# Patient Record
Sex: FEMALE | Race: BLACK OR AFRICAN AMERICAN | Employment: UNEMPLOYED | URBAN - METROPOLITAN AREA
[De-identification: names, ages, dates, MRNs, and addresses within clinical notes are randomized per-mention and may not be internally consistent; named-entity substitution may affect disease eponyms.]

---

## 2024-10-19 ENCOUNTER — HOSPITAL ENCOUNTER (EMERGENCY)
Age: 34
Discharge: HOME | End: 2024-10-19

## 2024-10-19 VITALS
HEART RATE: 90 BPM | OXYGEN SATURATION: 97 % | DIASTOLIC BLOOD PRESSURE: 103 MMHG | RESPIRATION RATE: 18 BRPM | TEMPERATURE: 98.4 F | SYSTOLIC BLOOD PRESSURE: 144 MMHG

## 2024-10-19 DIAGNOSIS — J02.9 VIRAL PHARYNGITIS: ICD-10-CM

## 2024-10-19 DIAGNOSIS — R05.9 COUGH, UNSPECIFIED TYPE: Primary | ICD-10-CM

## 2024-10-19 LAB
S PYO AG THROAT QL: NEGATIVE
SPECIMEN SOURCE: NORMAL

## 2024-10-19 PROCEDURE — 87880 STREP A ASSAY W/OPTIC: CPT

## 2024-10-19 PROCEDURE — 99283 EMERGENCY DEPT VISIT LOW MDM: CPT

## 2024-10-19 PROCEDURE — 87081 CULTURE SCREEN ONLY: CPT

## 2024-10-19 PROCEDURE — 6360000002 HC RX W HCPCS

## 2024-10-19 PROCEDURE — 6370000000 HC RX 637 (ALT 250 FOR IP)

## 2024-10-19 RX ORDER — DEXAMETHASONE SODIUM PHOSPHATE 10 MG/ML
10 INJECTION, SOLUTION INTRAMUSCULAR; INTRAVENOUS ONCE
Status: COMPLETED | OUTPATIENT
Start: 2024-10-19 | End: 2024-10-19

## 2024-10-19 RX ORDER — AZITHROMYCIN 250 MG/1
500 TABLET, FILM COATED ORAL ONCE
Status: COMPLETED | OUTPATIENT
Start: 2024-10-19 | End: 2024-10-19

## 2024-10-19 RX ORDER — AZITHROMYCIN 250 MG/1
TABLET, FILM COATED ORAL
Qty: 6 TABLET | Refills: 0 | Status: SHIPPED | OUTPATIENT
Start: 2024-10-19 | End: 2024-10-29

## 2024-10-19 RX ADMIN — AZITHROMYCIN DIHYDRATE 500 MG: 250 TABLET ORAL at 15:09

## 2024-10-19 RX ADMIN — DEXAMETHASONE SODIUM PHOSPHATE 10 MG: 10 INJECTION, SOLUTION INTRAMUSCULAR; INTRAVENOUS at 15:09

## 2024-10-19 ASSESSMENT — PAIN SCALES - GENERAL: PAINLEVEL_OUTOF10: 5

## 2024-10-19 ASSESSMENT — PAIN DESCRIPTION - LOCATION: LOCATION: THROAT

## 2024-10-19 ASSESSMENT — PAIN - FUNCTIONAL ASSESSMENT: PAIN_FUNCTIONAL_ASSESSMENT: 0-10

## 2024-10-19 NOTE — DISCHARGE INSTRUCTIONS
Ou negatif virginia gonzalez strep    Family Physicians taking new patients.     1. Call to schedule follow up hospital follow up appointment:   [facility] at [facility] [phone]   [address] [facility] [address] Room 30     [facility]  Dr Saeed and Luz Padron NP  Call for appt. [phone]  [address] [facility], [address]  ( All insurances accepted)    [facility] [phone]   [address] [facility], [address]     [facility] [phone]   [address] [facility], Citizen of [place] speaking Staff     2. Call for new patient Primary Care Physician appointment:   Physician Finder [phone]     Dr. Hutchinson and Dr. Gaviria [phone]   [address] [place], [place]     Gely Mitchell [phone]   [address] [place], [place]     Dr. Saeed and Luz Padron [phone]   [address] [facility], [address] [place], [place]     Dr. Harris and Indy Myers [phone]   [address] [place], [place]     Maria Esther Kirby [phone]   [address] [place], [place]     Bina Rodriguez CNP - [facility] [phone] [address] [place], [place] *Private Pay ONLY - Membership Program*     Scott Garcia PA and El Das [phone]   [address] [facility], [address] [facility] (*Active Waiting List*) [phone]   [address] [place], [place]     Dr. Elias [phone]   [address] [place], [place]     James Morelos [phone]   [address] [place], [place]     Dr. Narayan [phone]   [address] [facility] [address], [address] [place], [place]     Dr. Hutchinson and Dr. Lam [phone]   [address] [facility], [address] [place], [place]     Monica Cruz PA and Dee Wallis [phone] [address] [facility], [address] [place], [place]     Chelsea CURIEL,

## 2024-10-19 NOTE — CARE COORDINATION
CM review of pt chart for discharge needs for o/p resources and follow up. Community resource information placed in pt AVS. FLAQUITARN/CM

## 2024-10-19 NOTE — ED PROVIDER NOTES
Summary, DDx, ED Course, and Reassessment:   Herman Nogueira is a 34 y.o. female, [place] Creole speaking only requiring the , who presents complaining of worsening nonproductive cough and sore throat for the last 2 weeks.Upon arrival vitals reviewed and are within normal limits.  History and physical exam as above.    Patient is here with her twin daughters to have similar symptoms, but for a shorter duration, could be viral in etiology.  Patient does have rhonchi on the right side, and given the length of her cough, do think that we should cover with azithromycin.  Strep test is negative, no evidence of hypoxia, she has no shortness of breath, chest pain, do think stable for outpatient management.  Also will give Decadron for the viral pharyngitis    Differential diagnoses include but are not limited to Rhinovirus, coronavirus, adenovirus, sinusitis, pneumonia, parainfluenza, influenza, bronchitis, pyelonephritis, seasonal allergies, asthma foreign body, GERD, pertussis, ACEI induced, interstitial lung disease.     I am the Primary Clinician of Record.    FINAL IMPRESSION      1. Cough, unspecified type    2. Viral pharyngitis          DISPOSITION/PLAN     DISPOSITION Decision To Discharge 10/19/2024 03:07:53 PM  Condition at Disposition: Data Unavailable      CONSULTS: (Who and What was discussed)  None  Unless otherwise noted, none      CRITICAL CARE TIME (.cctime)   None    PATIENT REFERRED TO:  Blake Collazo  [address]  [facility] [address]  [phone]      call and schedule an appointment for this ER visit      DISCHARGE MEDICATIONS:  Discharge Medication List as of 10/19/2024  3:03 PM        START taking these medications    Details   azithromycin (ZITHROMAX) 250 MG tablet 500mg on day 1 followed by 250mg on days 2 - 5, Disp-6 tablet, R-0Normal             DISCONTINUED MEDICATIONS:  Discharge Medication List as of 10/19/2024  3:03 PM                 (Please note that portions of this

## 2024-10-21 LAB
MICROORGANISM SPEC CULT: NORMAL
SPECIMEN DESCRIPTION: NORMAL